# Patient Record
Sex: FEMALE | Race: WHITE | NOT HISPANIC OR LATINO | Employment: FULL TIME | URBAN - METROPOLITAN AREA
[De-identification: names, ages, dates, MRNs, and addresses within clinical notes are randomized per-mention and may not be internally consistent; named-entity substitution may affect disease eponyms.]

---

## 2024-11-28 ENCOUNTER — OFFICE VISIT (OUTPATIENT)
Dept: URGENT CARE | Age: 49
End: 2024-11-28
Payer: COMMERCIAL

## 2024-11-28 VITALS
HEART RATE: 73 BPM | OXYGEN SATURATION: 99 % | TEMPERATURE: 97.8 F | SYSTOLIC BLOOD PRESSURE: 110 MMHG | WEIGHT: 140 LBS | DIASTOLIC BLOOD PRESSURE: 75 MMHG | RESPIRATION RATE: 15 BRPM

## 2024-11-28 DIAGNOSIS — R30.0 DYSURIA: Primary | ICD-10-CM

## 2024-11-28 LAB
POC APPEARANCE, URINE: CLEAR
POC BILIRUBIN, URINE: NEGATIVE
POC BLOOD, URINE: NEGATIVE
POC COLOR, URINE: YELLOW
POC GLUCOSE, URINE: NEGATIVE MG/DL
POC KETONES, URINE: NEGATIVE MG/DL
POC LEUKOCYTES, URINE: ABNORMAL
POC NITRITE,URINE: NEGATIVE
POC PH, URINE: 7 PH
POC PROTEIN, URINE: NEGATIVE MG/DL
POC SPECIFIC GRAVITY, URINE: 1.01
POC UROBILINOGEN, URINE: 0.2 EU/DL
PREGNANCY TEST URINE, POC: NEGATIVE

## 2024-11-28 PROCEDURE — 87086 URINE CULTURE/COLONY COUNT: CPT

## 2024-11-28 RX ORDER — PHENAZOPYRIDINE HYDROCHLORIDE 200 MG/1
200 TABLET, FILM COATED ORAL 3 TIMES DAILY PRN
Qty: 9 TABLET | Refills: 0 | Status: SHIPPED | OUTPATIENT
Start: 2024-11-28 | End: 2024-12-01

## 2024-11-28 RX ORDER — FLUCONAZOLE 150 MG/1
TABLET ORAL
Qty: 2 TABLET | Refills: 0 | Status: SHIPPED | OUTPATIENT
Start: 2024-11-28

## 2024-11-28 RX ORDER — NITROFURANTOIN 25; 75 MG/1; MG/1
100 CAPSULE ORAL 2 TIMES DAILY
Qty: 14 CAPSULE | Refills: 0 | Status: SHIPPED | OUTPATIENT
Start: 2024-11-28 | End: 2024-12-05

## 2024-11-28 NOTE — PROGRESS NOTES
Subjective   Patient ID: Mayi Wilkinson is a 49 y.o. female. They present today with a chief complaint of Urinary Problem (Urine pain and stomach and back and stomach pain 1 day).    History of Present Illness  Pt presents to the UC today with the c/o pain with urination; lower abd pain and back pain. (+) itching and vaginal pressure. Denies fever or n/v. No other associated symptoms or concerns to address. Hx of UTIs per patient. Advised on if symptoms worsen will need to go to the ER for further evaluation.           Past Medical History  Allergies as of 11/28/2024    (No Known Allergies)       (Not in a hospital admission)       No past medical history on file.    No past surgical history on file.     reports that she has never smoked. She has never been exposed to tobacco smoke. She has never used smokeless tobacco. She reports that she does not drink alcohol and does not use drugs.    Review of Systems  Review of Systems  10 point ROS completed and all are negative other than what is stated in the current HPI                               Objective    Vitals:    11/28/24 1336   BP: 110/75   Pulse: 73   Resp: 15   Temp: 36.6 °C (97.8 °F)   SpO2: 99%   Weight: 63.5 kg (140 lb)     No LMP recorded.    Physical Exam  Constitutional:       Appearance: Normal appearance.   Abdominal:      General: Abdomen is flat. Bowel sounds are normal.      Palpations: Abdomen is soft.      Tenderness: There is right CVA tenderness and left CVA tenderness.   Skin:     General: Skin is warm and dry.   Neurological:      Mental Status: She is alert.         Procedures    Point of Care Test & Imaging Results from this visit  No results found for this visit on 11/28/24.   No results found.    Diagnostic study results (if any) were reviewed by IGLESIA Amin.    Assessment/Plan   Allergies, medications, history, and pertinent labs/EKGs/Imaging reviewed by IGLESIA Amin.     Medical Decision  Making  Dysuria:  -UA completed  -Good oral hydration; avoid holding urine  -C&S sent; will change abx if C&S suggestive  - Discussed UTI prevention methods with patient  -Advised on s/s to seek emergent care for  -f/u with PCP in the next week for re-evaluation of Urine  - Warm compress to back or lower abd if needed    Orders and Diagnoses  There are no diagnoses linked to this encounter.    Medical Admin Record      Patient disposition: Home    Electronically signed by IGLESIA Amin  1:42 PM

## 2024-11-28 NOTE — PATIENT INSTRUCTIONS
Dysuria:  -UA completed  -Good oral hydration; avoid holding urine  -C&S sent; will change abx if C&S suggestive  - Discussed UTI prevention methods with patient  -Advised on s/s to seek emergent care for  -f/u with PCP in the next week for re-evaluation of Urine  - Warm compress to back or lower abd if needed

## 2024-11-29 LAB — BACTERIA UR CULT: NORMAL
